# Patient Record
Sex: FEMALE | Race: WHITE | Employment: UNEMPLOYED
[De-identification: names, ages, dates, MRNs, and addresses within clinical notes are randomized per-mention and may not be internally consistent; named-entity substitution may affect disease eponyms.]

---
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
## 2023-12-15 ENCOUNTER — HOSPITAL ENCOUNTER
Dept: HOSPITAL 101 - US | Age: 23
Discharge: HOME | End: 2023-12-15
Payer: SELF-PAY

## 2023-12-15 DIAGNOSIS — N92.6: ICD-10-CM

## 2023-12-15 DIAGNOSIS — Z34.81: Primary | ICD-10-CM

## 2023-12-15 DIAGNOSIS — Z3A.01: ICD-10-CM

## 2023-12-15 PROCEDURE — 76817 TRANSVAGINAL US OBSTETRIC: CPT

## 2024-01-31 ENCOUNTER — HOSPITAL ENCOUNTER
Dept: HOSPITAL 101 - LAB | Age: 24
Discharge: HOME | End: 2024-01-31
Payer: SELF-PAY

## 2024-01-31 DIAGNOSIS — N92.6: Primary | ICD-10-CM

## 2024-01-31 DIAGNOSIS — Z36.0: ICD-10-CM

## 2024-01-31 LAB
ADD MANUAL DIFF: NO
HCT VFR BLD CALC: 41.6 % (ref 36–48)
HEMATOCRIT: 41.6 % (ref 36–48)
HEMOGLOBIN: 14.1 G/DL (ref 12–16)
IMMATURE GRANULOCYTES ABS AUTO: 0.03 10^3/UL (ref 0–0.03)
IMMATURE GRANULOCYTES PCT AUTO: 0.3 % (ref 0–0.5)
LYMPHOCYTES  ABSOLUTE AUTO: 1.7 10^3/UL (ref 1.2–3.8)
MCV RBC: 83.7 FL (ref 81–99)
MEAN CORPUSCULAR HEMOGLOBIN: 28.4 PG (ref 26.7–34)
MEAN CORPUSCULAR HGB CONC: 33.9 G/DL (ref 29.9–35.2)
MEAN CORPUSCULAR VOLUME: 83.7 FL (ref 81–99)
PLATELET # BLD: 231 10^3/UL (ref 150–450)
PLATELET COUNT: 231 10^3/UL (ref 150–450)
RED BLOOD COUNT: 4.97 10^6/UL (ref 4.2–5.4)
THYROID STIMULATING HORMONE: 0.69 UIU/ML (ref 0.36–3.74)
WBC # BLD: 9.7 10^3/UL (ref 4–11)
WHITE BLOOD COUNT: 9.7 10^3/UL (ref 4–11)

## 2024-01-31 PROCEDURE — 87389 HIV-1 AG W/HIV-1&-2 AB AG IA: CPT

## 2024-01-31 PROCEDURE — 86803 HEPATITIS C AB TEST: CPT

## 2024-01-31 PROCEDURE — 87340 HEPATITIS B SURFACE AG IA: CPT

## 2024-01-31 PROCEDURE — 86762 RUBELLA ANTIBODY: CPT

## 2024-01-31 PROCEDURE — 87086 URINE CULTURE/COLONY COUNT: CPT

## 2024-01-31 PROCEDURE — 83036 HEMOGLOBIN GLYCOSYLATED A1C: CPT

## 2024-01-31 PROCEDURE — 86901 BLOOD TYPING SEROLOGIC RH(D): CPT

## 2024-01-31 PROCEDURE — 86592 SYPHILIS TEST NON-TREP QUAL: CPT

## 2024-01-31 PROCEDURE — 36415 COLL VENOUS BLD VENIPUNCTURE: CPT

## 2024-01-31 PROCEDURE — 86850 RBC ANTIBODY SCREEN: CPT

## 2024-01-31 PROCEDURE — 85025 COMPLETE CBC W/AUTO DIFF WBC: CPT

## 2024-01-31 PROCEDURE — 84443 ASSAY THYROID STIM HORMONE: CPT

## 2024-01-31 PROCEDURE — 86900 BLOOD TYPING SEROLOGIC ABO: CPT

## 2024-02-01 LAB — RUBELLA ANTIBODIES, IGG: 9.21 INDEX

## 2024-02-12 ENCOUNTER — HOSPITAL ENCOUNTER (OUTPATIENT)
Dept: HOSPITAL 101 - LAB | Age: 24
Discharge: HOME | End: 2024-02-12
Payer: SELF-PAY

## 2024-02-12 DIAGNOSIS — Z01.419: Primary | ICD-10-CM

## 2024-02-12 PROCEDURE — G0145 SCR C/V CYTO,THINLAYER,RESCR: HCPCS

## 2024-03-12 ENCOUNTER — HOSPITAL ENCOUNTER
Dept: HOSPITAL 101 - NOMS | Age: 24
Discharge: HOME | End: 2024-03-12
Payer: COMMERCIAL

## 2024-03-12 ENCOUNTER — HOSPITAL ENCOUNTER
Age: 24
Discharge: HOME | End: 2024-03-12
Payer: COMMERCIAL

## 2024-03-12 DIAGNOSIS — Z34.92: Primary | ICD-10-CM

## 2024-03-12 DIAGNOSIS — Z3A.20: ICD-10-CM

## 2024-03-12 DIAGNOSIS — Z36.89: Primary | ICD-10-CM

## 2024-03-12 DIAGNOSIS — Z36.89: ICD-10-CM

## 2024-03-12 PROCEDURE — 36415 COLL VENOUS BLD VENIPUNCTURE: CPT

## 2024-03-12 PROCEDURE — 76817 TRANSVAGINAL US OBSTETRIC: CPT

## 2024-03-12 PROCEDURE — 76805 OB US >/= 14 WKS SNGL FETUS: CPT

## 2024-03-12 PROCEDURE — 82105 ALPHA-FETOPROTEIN SERUM: CPT

## 2024-04-11 ENCOUNTER — TELEPHONE (OUTPATIENT)
Dept: PERINATAL CARE | Age: 24
End: 2024-04-11

## 2024-04-11 ENCOUNTER — HOSPITAL ENCOUNTER (OUTPATIENT)
Age: 24
Setting detail: SPECIMEN
Discharge: HOME | End: 2024-04-11

## 2024-04-11 ENCOUNTER — ROUTINE PRENATAL (OUTPATIENT)
Dept: PERINATAL CARE | Age: 24
End: 2024-04-11
Payer: COMMERCIAL

## 2024-04-11 VITALS
SYSTOLIC BLOOD PRESSURE: 118 MMHG | HEIGHT: 64 IN | HEART RATE: 80 BPM | RESPIRATION RATE: 16 BRPM | BODY MASS INDEX: 27.31 KG/M2 | DIASTOLIC BLOOD PRESSURE: 82 MMHG | TEMPERATURE: 98.4 F | WEIGHT: 160 LBS

## 2024-04-11 DIAGNOSIS — O99.282 METHYLENETETRAHYDROFOLATE REDUCTASE DEFICIENCY AFFECTING PREGNANCY IN SECOND TRIMESTER (HCC): ICD-10-CM

## 2024-04-11 DIAGNOSIS — D68.59 MATERNAL ANTITHROMBIN III DEFICIENCY COMPLICATING PREGNANCY (HCC): Primary | ICD-10-CM

## 2024-04-11 DIAGNOSIS — E72.12 METHYLENETETRAHYDROFOLATE REDUCTASE DEFICIENCY AFFECTING PREGNANCY IN SECOND TRIMESTER (HCC): ICD-10-CM

## 2024-04-11 DIAGNOSIS — O99.891 CURRENT MATERNAL CONDITION AFFECTING PREGNANCY: ICD-10-CM

## 2024-04-11 DIAGNOSIS — Z3A.24 24 WEEKS GESTATION OF PREGNANCY: ICD-10-CM

## 2024-04-11 DIAGNOSIS — O99.119 MATERNAL ANTITHROMBIN III DEFICIENCY COMPLICATING PREGNANCY (HCC): Primary | ICD-10-CM

## 2024-04-11 DIAGNOSIS — Z82.49 FAMILY HISTORY OF THROMBOEMBOLIC DISEASE: ICD-10-CM

## 2024-04-11 PROCEDURE — 76811 OB US DETAILED SNGL FETUS: CPT | Performed by: OBSTETRICS & GYNECOLOGY

## 2024-04-11 NOTE — TELEPHONE ENCOUNTER
Lovenox 40 mg injection sq daily a 12 week supply Rx was called into the pharmacy to [place]. Rx was called in by [facility] per Dr. Briseyda SPANGLER.

## 2024-04-12 LAB
SEND OUT REPORT: NORMAL
TEST NAME: NORMAL

## 2024-05-08 ENCOUNTER — HOSPITAL ENCOUNTER
Dept: HOSPITAL 101 - LAB | Age: 24
Discharge: HOME | End: 2024-05-08
Payer: COMMERCIAL

## 2024-05-08 DIAGNOSIS — Z13.1: Primary | ICD-10-CM

## 2024-05-08 LAB
ADD MANUAL DIFF: NO
GLUCOSE 1 HOUR: 108 MG/DL (ref ?–130)
HEMATOCRIT: 33 % (ref 36–48)
HEMOGLOBIN: 11 G/DL (ref 12–16)
IMMATURE GRANULOCYTES ABS AUTO: 0.03 10^3/UL (ref 0–0.03)
IMMATURE GRANULOCYTES PCT AUTO: 0.3 % (ref 0–0.5)
LYMPHOCYTES  ABSOLUTE AUTO: 1.5 10^3/UL (ref 1.2–3.8)
MCV RBC: 82.9 FL (ref 81–99)
MEAN CORPUSCULAR HEMOGLOBIN: 27.6 PG (ref 26.7–34)
MEAN CORPUSCULAR HGB CONC: 33.3 G/DL (ref 29.9–35.2)
PLATELET COUNT: 223 10^3/UL (ref 150–450)
RED BLOOD COUNT: 3.98 10^6/UL (ref 4.2–5.4)
WHITE BLOOD COUNT: 9.2 10^3/UL (ref 4–11)

## 2024-05-08 PROCEDURE — 36415 COLL VENOUS BLD VENIPUNCTURE: CPT

## 2024-05-08 PROCEDURE — 85025 COMPLETE CBC W/AUTO DIFF WBC: CPT

## 2024-05-08 PROCEDURE — 82950 GLUCOSE TEST: CPT

## 2024-05-23 ENCOUNTER — ROUTINE PRENATAL (OUTPATIENT)
Dept: PERINATAL CARE | Age: 24
End: 2024-05-23
Payer: COMMERCIAL

## 2024-05-23 VITALS
WEIGHT: 160 LBS | SYSTOLIC BLOOD PRESSURE: 120 MMHG | DIASTOLIC BLOOD PRESSURE: 84 MMHG | BODY MASS INDEX: 27.31 KG/M2 | TEMPERATURE: 98.1 F | RESPIRATION RATE: 16 BRPM | HEIGHT: 64 IN | HEART RATE: 88 BPM

## 2024-05-23 DIAGNOSIS — D68.59 MATERNAL ANTITHROMBIN III DEFICIENCY COMPLICATING PREGNANCY (HCC): Primary | ICD-10-CM

## 2024-05-23 DIAGNOSIS — O99.283 METHYLENETETRAHYDROFOLATE REDUCTASE DEFICIENCY AFFECTING PREGNANCY IN THIRD TRIMESTER (HCC): ICD-10-CM

## 2024-05-23 DIAGNOSIS — O99.891 CURRENT MATERNAL CONDITION AFFECTING PREGNANCY: ICD-10-CM

## 2024-05-23 DIAGNOSIS — Z36.4 ULTRASOUND FOR ANTENATAL SCREENING FOR FETAL GROWTH RESTRICTION: ICD-10-CM

## 2024-05-23 DIAGNOSIS — Z3A.30 30 WEEKS GESTATION OF PREGNANCY: ICD-10-CM

## 2024-05-23 DIAGNOSIS — Z82.49 FAMILY HISTORY OF THROMBOEMBOLIC DISEASE: ICD-10-CM

## 2024-05-23 DIAGNOSIS — O99.119 MATERNAL ANTITHROMBIN III DEFICIENCY COMPLICATING PREGNANCY (HCC): Primary | ICD-10-CM

## 2024-05-23 DIAGNOSIS — E72.12 METHYLENETETRAHYDROFOLATE REDUCTASE DEFICIENCY AFFECTING PREGNANCY IN THIRD TRIMESTER (HCC): ICD-10-CM

## 2024-05-23 PROCEDURE — 76819 FETAL BIOPHYS PROFIL W/O NST: CPT | Performed by: OBSTETRICS & GYNECOLOGY

## 2024-05-23 PROCEDURE — 76816 OB US FOLLOW-UP PER FETUS: CPT | Performed by: OBSTETRICS & GYNECOLOGY

## 2024-05-23 PROCEDURE — 99999 PR OFFICE/OUTPT VISIT,PROCEDURE ONLY: CPT | Performed by: OBSTETRICS & GYNECOLOGY

## 2024-05-23 RX ORDER — ENOXAPARIN SODIUM 100 MG/ML
40 INJECTION SUBCUTANEOUS DAILY
COMMUNITY
Start: 2024-04-12

## 2024-06-26 ENCOUNTER — ROUTINE PRENATAL (OUTPATIENT)
Dept: PERINATAL CARE | Age: 24
End: 2024-06-26
Payer: COMMERCIAL

## 2024-06-26 VITALS
BODY MASS INDEX: 27.74 KG/M2 | TEMPERATURE: 97.7 F | HEART RATE: 81 BPM | SYSTOLIC BLOOD PRESSURE: 135 MMHG | WEIGHT: 162.48 LBS | DIASTOLIC BLOOD PRESSURE: 80 MMHG | HEIGHT: 64 IN | RESPIRATION RATE: 16 BRPM

## 2024-06-26 DIAGNOSIS — Z36.4 ULTRASOUND FOR ANTENATAL SCREENING FOR FETAL GROWTH RESTRICTION: ICD-10-CM

## 2024-06-26 DIAGNOSIS — Z36.3 ENCOUNTER FOR ROUTINE SCREENING FOR MALFORMATION USING ULTRASONICS: ICD-10-CM

## 2024-06-26 DIAGNOSIS — E72.12 METHYLENETETRAHYDROFOLATE REDUCTASE DEFICIENCY AFFECTING PREGNANCY IN THIRD TRIMESTER (HCC): ICD-10-CM

## 2024-06-26 DIAGNOSIS — Z3A.35 35 WEEKS GESTATION OF PREGNANCY: ICD-10-CM

## 2024-06-26 DIAGNOSIS — O99.119 MATERNAL ANTITHROMBIN III DEFICIENCY COMPLICATING PREGNANCY (HCC): Primary | ICD-10-CM

## 2024-06-26 DIAGNOSIS — Z82.49 FAMILY HISTORY OF THROMBOEMBOLIC DISEASE: ICD-10-CM

## 2024-06-26 DIAGNOSIS — O99.891 CURRENT MATERNAL CONDITION AFFECTING PREGNANCY: ICD-10-CM

## 2024-06-26 DIAGNOSIS — O99.283 METHYLENETETRAHYDROFOLATE REDUCTASE DEFICIENCY AFFECTING PREGNANCY IN THIRD TRIMESTER (HCC): ICD-10-CM

## 2024-06-26 DIAGNOSIS — D68.59 MATERNAL ANTITHROMBIN III DEFICIENCY COMPLICATING PREGNANCY (HCC): Primary | ICD-10-CM

## 2024-06-26 PROCEDURE — 99999 PR OFFICE/OUTPT VISIT,PROCEDURE ONLY: CPT | Performed by: OBSTETRICS & GYNECOLOGY

## 2024-06-26 PROCEDURE — 76805 OB US >/= 14 WKS SNGL FETUS: CPT | Performed by: OBSTETRICS & GYNECOLOGY

## 2024-06-26 PROCEDURE — 76819 FETAL BIOPHYS PROFIL W/O NST: CPT | Performed by: OBSTETRICS & GYNECOLOGY

## 2024-07-03 ENCOUNTER — HOSPITAL ENCOUNTER (OUTPATIENT)
Dept: HOSPITAL 101 - LAB | Age: 24
Discharge: HOME | End: 2024-07-03
Payer: COMMERCIAL

## 2024-07-03 DIAGNOSIS — Z34.93: Primary | ICD-10-CM

## 2024-07-03 PROCEDURE — 87081 CULTURE SCREEN ONLY: CPT

## 2024-07-16 ENCOUNTER — HOSPITAL ENCOUNTER (INPATIENT)
Age: 24
LOS: 2 days | Discharge: HOME | DRG: 540 | End: 2024-07-18
Payer: COMMERCIAL

## 2024-07-16 VITALS — SYSTOLIC BLOOD PRESSURE: 121 MMHG | HEART RATE: 72 BPM | DIASTOLIC BLOOD PRESSURE: 75 MMHG

## 2024-07-16 VITALS — DIASTOLIC BLOOD PRESSURE: 89 MMHG | SYSTOLIC BLOOD PRESSURE: 120 MMHG | OXYGEN SATURATION: 97 % | HEART RATE: 60 BPM

## 2024-07-16 VITALS — OXYGEN SATURATION: 96 % | HEART RATE: 64 BPM

## 2024-07-16 VITALS — SYSTOLIC BLOOD PRESSURE: 131 MMHG | HEART RATE: 78 BPM | DIASTOLIC BLOOD PRESSURE: 90 MMHG

## 2024-07-16 VITALS — SYSTOLIC BLOOD PRESSURE: 126 MMHG | HEART RATE: 81 BPM | OXYGEN SATURATION: 96 % | DIASTOLIC BLOOD PRESSURE: 83 MMHG

## 2024-07-16 VITALS — OXYGEN SATURATION: 96 % | HEART RATE: 80 BPM

## 2024-07-16 VITALS — HEART RATE: 66 BPM | OXYGEN SATURATION: 96 % | DIASTOLIC BLOOD PRESSURE: 85 MMHG | SYSTOLIC BLOOD PRESSURE: 121 MMHG

## 2024-07-16 VITALS — SYSTOLIC BLOOD PRESSURE: 116 MMHG | OXYGEN SATURATION: 96 % | HEART RATE: 59 BPM | DIASTOLIC BLOOD PRESSURE: 84 MMHG

## 2024-07-16 VITALS — DIASTOLIC BLOOD PRESSURE: 102 MMHG | SYSTOLIC BLOOD PRESSURE: 127 MMHG

## 2024-07-16 VITALS — SYSTOLIC BLOOD PRESSURE: 125 MMHG | DIASTOLIC BLOOD PRESSURE: 85 MMHG | OXYGEN SATURATION: 96 % | HEART RATE: 90 BPM

## 2024-07-16 VITALS — HEART RATE: 94 BPM | DIASTOLIC BLOOD PRESSURE: 73 MMHG | SYSTOLIC BLOOD PRESSURE: 113 MMHG

## 2024-07-16 VITALS — DIASTOLIC BLOOD PRESSURE: 91 MMHG | OXYGEN SATURATION: 97 % | HEART RATE: 82 BPM | SYSTOLIC BLOOD PRESSURE: 130 MMHG

## 2024-07-16 VITALS — SYSTOLIC BLOOD PRESSURE: 130 MMHG | HEART RATE: 81 BPM | DIASTOLIC BLOOD PRESSURE: 88 MMHG

## 2024-07-16 VITALS — HEART RATE: 66 BPM | SYSTOLIC BLOOD PRESSURE: 93 MMHG | DIASTOLIC BLOOD PRESSURE: 53 MMHG

## 2024-07-16 VITALS — DIASTOLIC BLOOD PRESSURE: 68 MMHG | TEMPERATURE: 98.1 F | SYSTOLIC BLOOD PRESSURE: 112 MMHG | HEART RATE: 74 BPM

## 2024-07-16 VITALS — DIASTOLIC BLOOD PRESSURE: 64 MMHG | HEART RATE: 60 BPM | SYSTOLIC BLOOD PRESSURE: 107 MMHG

## 2024-07-16 VITALS — OXYGEN SATURATION: 97 % | HEART RATE: 59 BPM

## 2024-07-16 VITALS — HEART RATE: 73 BPM | SYSTOLIC BLOOD PRESSURE: 109 MMHG | DIASTOLIC BLOOD PRESSURE: 79 MMHG | OXYGEN SATURATION: 97 %

## 2024-07-16 VITALS — OXYGEN SATURATION: 96 % | HEART RATE: 87 BPM | SYSTOLIC BLOOD PRESSURE: 133 MMHG | DIASTOLIC BLOOD PRESSURE: 83 MMHG

## 2024-07-16 VITALS — SYSTOLIC BLOOD PRESSURE: 117 MMHG | DIASTOLIC BLOOD PRESSURE: 75 MMHG | HEART RATE: 77 BPM

## 2024-07-16 VITALS — OXYGEN SATURATION: 97 % | SYSTOLIC BLOOD PRESSURE: 111 MMHG | DIASTOLIC BLOOD PRESSURE: 80 MMHG | HEART RATE: 73 BPM

## 2024-07-16 VITALS — SYSTOLIC BLOOD PRESSURE: 120 MMHG | DIASTOLIC BLOOD PRESSURE: 87 MMHG | HEART RATE: 93 BPM

## 2024-07-16 VITALS — HEART RATE: 67 BPM | OXYGEN SATURATION: 97 %

## 2024-07-16 VITALS — DIASTOLIC BLOOD PRESSURE: 72 MMHG | SYSTOLIC BLOOD PRESSURE: 113 MMHG | HEART RATE: 57 BPM

## 2024-07-16 VITALS — SYSTOLIC BLOOD PRESSURE: 117 MMHG | DIASTOLIC BLOOD PRESSURE: 77 MMHG | OXYGEN SATURATION: 97 % | HEART RATE: 70 BPM

## 2024-07-16 VITALS — SYSTOLIC BLOOD PRESSURE: 125 MMHG | HEART RATE: 95 BPM | DIASTOLIC BLOOD PRESSURE: 69 MMHG

## 2024-07-16 VITALS — HEART RATE: 84 BPM | OXYGEN SATURATION: 96 %

## 2024-07-16 VITALS — HEART RATE: 57 BPM | DIASTOLIC BLOOD PRESSURE: 82 MMHG | SYSTOLIC BLOOD PRESSURE: 114 MMHG

## 2024-07-16 VITALS — TEMPERATURE: 97.2 F

## 2024-07-16 VITALS — OXYGEN SATURATION: 97 % | SYSTOLIC BLOOD PRESSURE: 123 MMHG | DIASTOLIC BLOOD PRESSURE: 81 MMHG | HEART RATE: 78 BPM

## 2024-07-16 VITALS — OXYGEN SATURATION: 97 % | HEART RATE: 80 BPM

## 2024-07-16 VITALS — SYSTOLIC BLOOD PRESSURE: 137 MMHG | DIASTOLIC BLOOD PRESSURE: 92 MMHG | HEART RATE: 79 BPM

## 2024-07-16 VITALS — DIASTOLIC BLOOD PRESSURE: 91 MMHG | SYSTOLIC BLOOD PRESSURE: 132 MMHG

## 2024-07-16 VITALS — HEART RATE: 60 BPM | OXYGEN SATURATION: 97 %

## 2024-07-16 VITALS — TEMPERATURE: 98.3 F

## 2024-07-16 VITALS — SYSTOLIC BLOOD PRESSURE: 133 MMHG | DIASTOLIC BLOOD PRESSURE: 81 MMHG | HEART RATE: 92 BPM

## 2024-07-16 VITALS — HEART RATE: 84 BPM | DIASTOLIC BLOOD PRESSURE: 87 MMHG | OXYGEN SATURATION: 97 % | SYSTOLIC BLOOD PRESSURE: 140 MMHG

## 2024-07-16 VITALS — HEART RATE: 79 BPM

## 2024-07-16 VITALS — SYSTOLIC BLOOD PRESSURE: 120 MMHG | DIASTOLIC BLOOD PRESSURE: 71 MMHG | HEART RATE: 107 BPM

## 2024-07-16 VITALS — OXYGEN SATURATION: 96 % | HEART RATE: 58 BPM | SYSTOLIC BLOOD PRESSURE: 128 MMHG | DIASTOLIC BLOOD PRESSURE: 81 MMHG

## 2024-07-16 VITALS — SYSTOLIC BLOOD PRESSURE: 109 MMHG | HEART RATE: 63 BPM | DIASTOLIC BLOOD PRESSURE: 64 MMHG

## 2024-07-16 VITALS — HEART RATE: 84 BPM | DIASTOLIC BLOOD PRESSURE: 59 MMHG | SYSTOLIC BLOOD PRESSURE: 100 MMHG

## 2024-07-16 VITALS — SYSTOLIC BLOOD PRESSURE: 122 MMHG | DIASTOLIC BLOOD PRESSURE: 93 MMHG

## 2024-07-16 VITALS — HEART RATE: 93 BPM | SYSTOLIC BLOOD PRESSURE: 137 MMHG | DIASTOLIC BLOOD PRESSURE: 88 MMHG

## 2024-07-16 VITALS — DIASTOLIC BLOOD PRESSURE: 65 MMHG | HEART RATE: 66 BPM | SYSTOLIC BLOOD PRESSURE: 117 MMHG

## 2024-07-16 VITALS — HEART RATE: 69 BPM | DIASTOLIC BLOOD PRESSURE: 56 MMHG | SYSTOLIC BLOOD PRESSURE: 108 MMHG

## 2024-07-16 VITALS — TEMPERATURE: 97.34 F

## 2024-07-16 VITALS — DIASTOLIC BLOOD PRESSURE: 88 MMHG | HEART RATE: 89 BPM | OXYGEN SATURATION: 95 % | SYSTOLIC BLOOD PRESSURE: 123 MMHG

## 2024-07-16 VITALS — HEART RATE: 72 BPM | OXYGEN SATURATION: 95 %

## 2024-07-16 VITALS — TEMPERATURE: 98.6 F

## 2024-07-16 VITALS — DIASTOLIC BLOOD PRESSURE: 67 MMHG | HEART RATE: 61 BPM | SYSTOLIC BLOOD PRESSURE: 104 MMHG

## 2024-07-16 VITALS — HEART RATE: 62 BPM | OXYGEN SATURATION: 96 % | SYSTOLIC BLOOD PRESSURE: 120 MMHG | DIASTOLIC BLOOD PRESSURE: 82 MMHG

## 2024-07-16 VITALS — SYSTOLIC BLOOD PRESSURE: 107 MMHG | HEART RATE: 63 BPM | DIASTOLIC BLOOD PRESSURE: 64 MMHG

## 2024-07-16 VITALS — SYSTOLIC BLOOD PRESSURE: 152 MMHG | DIASTOLIC BLOOD PRESSURE: 105 MMHG

## 2024-07-16 VITALS — HEART RATE: 74 BPM | OXYGEN SATURATION: 97 %

## 2024-07-16 VITALS — TEMPERATURE: 97.1 F

## 2024-07-16 VITALS — TEMPERATURE: 97.16 F

## 2024-07-16 VITALS — TEMPERATURE: 96.44 F

## 2024-07-16 DIAGNOSIS — F41.9: ICD-10-CM

## 2024-07-16 DIAGNOSIS — Z3A.38: ICD-10-CM

## 2024-07-16 DIAGNOSIS — Z87.891: ICD-10-CM

## 2024-07-16 DIAGNOSIS — I69.351: ICD-10-CM

## 2024-07-16 LAB
CANNABINOID SCREEN URINE: NEGATIVE
HCT VFR BLD CALC: 35.5 % (ref 36–48)
HEMATOCRIT: 35.5 % (ref 36–48)
HEMOGLOBIN: 11.3 G/DL (ref 12–16)
MCV RBC: 77 FL (ref 81–99)
MEAN CORPUSCULAR HEMOGLOBIN: 24.5 PG (ref 26.7–34)
MEAN CORPUSCULAR HGB CONC: 31.8 G/DL (ref 29.9–35.2)
MEAN CORPUSCULAR VOLUME: 77 FL (ref 81–99)
METHAMPHETAMINES SCREEN URINE: NEGATIVE
PLATELET # BLD: 216 10^3/UL (ref 150–450)
PLATELET COUNT: 216 10^3/UL (ref 150–450)
RED BLOOD COUNT: 4.61 10^6/UL (ref 4.2–5.4)
TRICYCLIC ANTIDEPRESSANT URINE: NEGATIVE
WBC # BLD: 9.9 10^3/UL (ref 4–11)
WHITE BLOOD COUNT: 9.9 10^3/UL (ref 4–11)

## 2024-07-16 PROCEDURE — 88307 TISSUE EXAM BY PATHOLOGIST: CPT

## 2024-07-16 PROCEDURE — 86870 RBC ANTIBODY IDENTIFICATION: CPT

## 2024-07-16 PROCEDURE — 59050 FETAL MONITOR W/REPORT: CPT

## 2024-07-16 PROCEDURE — 86900 BLOOD TYPING SEROLOGIC ABO: CPT

## 2024-07-16 PROCEDURE — 96376 TX/PRO/DX INJ SAME DRUG ADON: CPT

## 2024-07-16 PROCEDURE — 96375 TX/PRO/DX INJ NEW DRUG ADDON: CPT

## 2024-07-16 PROCEDURE — 86885 COOMBS TEST INDIRECT QUAL: CPT

## 2024-07-16 PROCEDURE — 86906 BLD TYPING SEROLOGIC RH PHNT: CPT

## 2024-07-16 PROCEDURE — 36415 COLL VENOUS BLD VENIPUNCTURE: CPT

## 2024-07-16 PROCEDURE — 86901 BLOOD TYPING SEROLOGIC RH(D): CPT

## 2024-07-16 PROCEDURE — 85027 COMPLETE CBC AUTOMATED: CPT

## 2024-07-16 PROCEDURE — 80307 DRUG TEST PRSMV CHEM ANLYZR: CPT

## 2024-07-16 PROCEDURE — 86850 RBC ANTIBODY SCREEN: CPT

## 2024-07-16 PROCEDURE — 96366 THER/PROPH/DIAG IV INF ADDON: CPT

## 2024-07-16 PROCEDURE — 86905 BLOOD TYPING RBC ANTIGENS: CPT

## 2024-07-16 PROCEDURE — 86880 COOMBS TEST DIRECT: CPT

## 2024-07-16 PROCEDURE — 94667 MNPJ CHEST WALL 1ST: CPT

## 2024-07-16 PROCEDURE — 96372 THER/PROPH/DIAG INJ SC/IM: CPT

## 2024-07-16 PROCEDURE — 96365 THER/PROPH/DIAG IV INF INIT: CPT

## 2024-07-16 PROCEDURE — 85025 COMPLETE CBC W/AUTO DIFF WBC: CPT

## 2024-07-17 VITALS — DIASTOLIC BLOOD PRESSURE: 104 MMHG | SYSTOLIC BLOOD PRESSURE: 153 MMHG

## 2024-07-17 VITALS — HEART RATE: 68 BPM | DIASTOLIC BLOOD PRESSURE: 82 MMHG | SYSTOLIC BLOOD PRESSURE: 121 MMHG

## 2024-07-17 VITALS — TEMPERATURE: 98.1 F

## 2024-07-17 VITALS — DIASTOLIC BLOOD PRESSURE: 100 MMHG | SYSTOLIC BLOOD PRESSURE: 141 MMHG

## 2024-07-17 VITALS — DIASTOLIC BLOOD PRESSURE: 111 MMHG | SYSTOLIC BLOOD PRESSURE: 139 MMHG

## 2024-07-17 VITALS — DIASTOLIC BLOOD PRESSURE: 99 MMHG | SYSTOLIC BLOOD PRESSURE: 140 MMHG

## 2024-07-17 VITALS — HEART RATE: 64 BPM | SYSTOLIC BLOOD PRESSURE: 116 MMHG | DIASTOLIC BLOOD PRESSURE: 81 MMHG

## 2024-07-17 VITALS — TEMPERATURE: 97.16 F

## 2024-07-17 LAB
ADD MANUAL DIFF: NO
HCT VFR BLD CALC: 30.6 % (ref 36–48)
HEMATOCRIT: 30.6 % (ref 36–48)
HEMOGLOBIN: 9.7 G/DL (ref 12–16)
IMMATURE GRANULOCYTES ABS AUTO: 0.13 10^3/UL (ref 0–0.03)
IMMATURE GRANULOCYTES PCT AUTO: 0.7 % (ref 0–0.5)
LYMPHOCYTES  ABSOLUTE AUTO: 1.1 10^3/UL (ref 1.2–3.8)
MCV RBC: 79.5 FL (ref 81–99)
MEAN CORPUSCULAR HEMOGLOBIN: 25.2 PG (ref 26.7–34)
MEAN CORPUSCULAR HGB CONC: 31.7 G/DL (ref 29.9–35.2)
MEAN CORPUSCULAR VOLUME: 79.5 FL (ref 81–99)
PLATELET # BLD: 214 10^3/UL (ref 150–450)
PLATELET COUNT: 214 10^3/UL (ref 150–450)
RED BLOOD COUNT: 3.85 10^6/UL (ref 4.2–5.4)
WBC # BLD: 19.3 10^3/UL (ref 4–11)
WHITE BLOOD COUNT: 19.3 10^3/UL (ref 4–11)

## 2024-07-18 VITALS — TEMPERATURE: 97.4 F

## 2024-07-18 VITALS — HEART RATE: 68 BPM | DIASTOLIC BLOOD PRESSURE: 88 MMHG | SYSTOLIC BLOOD PRESSURE: 124 MMHG

## 2024-07-18 VITALS — DIASTOLIC BLOOD PRESSURE: 62 MMHG | SYSTOLIC BLOOD PRESSURE: 119 MMHG | HEART RATE: 74 BPM

## 2024-07-18 VITALS — TEMPERATURE: 98.42 F

## 2024-08-27 ENCOUNTER — HOSPITAL ENCOUNTER
Dept: HOSPITAL 101 - PST | Age: 24
Discharge: HOME | End: 2024-08-27
Payer: COMMERCIAL

## 2024-08-27 VITALS
OXYGEN SATURATION: 97 % | DIASTOLIC BLOOD PRESSURE: 85 MMHG | HEART RATE: 61 BPM | TEMPERATURE: 97.3 F | SYSTOLIC BLOOD PRESSURE: 132 MMHG

## 2024-08-27 DIAGNOSIS — Z01.812: Primary | ICD-10-CM

## 2024-08-27 LAB
ADD MANUAL DIFF: NO
ANION GAP: 12
BLOOD UREA NITROGEN: 9 MG/DL (ref 7–18)
CALCIUM: 9.1 MG/DL (ref 8.5–10.1)
CARBON DIOXIDE: 28.6 MMOL/L (ref 21–32)
CHLORIDE: 106 MMOL/L (ref 98–107)
CO2 BLD-SCNC: 28.6 MMOL/L (ref 21–32)
ESTIMATED GFR (AFRICAN AMERICA: >60 (ref 60–?)
ESTIMATED GFR (NON-AFRICAN AME: >60 (ref 60–?)
GLUCOSE BLD-MCNC: 96 MG/DL (ref 74–106)
HCT VFR BLD CALC: 34.3 % (ref 36–48)
HEMATOCRIT: 34.3 % (ref 36–48)
HEMOGLOBIN: 10.7 G/DL (ref 12–16)
IMMATURE GRANULOCYTES ABS AUTO: 0.01 10^3/UL (ref 0–0.03)
IMMATURE GRANULOCYTES PCT AUTO: 0.2 % (ref 0–0.5)
INR: 1.13
LYMPHOCYTES  ABSOLUTE AUTO: 1.7 10^3/UL (ref 1.2–3.8)
MCV RBC: 78.1 FL (ref 81–99)
MEAN CORPUSCULAR HEMOGLOBIN: 24.4 PG (ref 26.7–34)
MEAN CORPUSCULAR HGB CONC: 31.2 G/DL (ref 29.9–35.2)
MEAN CORPUSCULAR VOLUME: 78.1 FL (ref 81–99)
PARTIAL THROMBOPLASTIN TIME: 28.9 SEC (ref 22.3–36.2)
PLATELET # BLD: 231 10^3/UL (ref 150–450)
PLATELET COUNT: 231 10^3/UL (ref 150–450)
POTASSIUM SERPLBLD-SCNC: 3.6 MMOL/L (ref 3.5–5.1)
POTASSIUM: 3.6 MMOL/L (ref 3.5–5.1)
PROTHROMBIN TIME: 11.8 SEC (ref 9–11.6)
RED BLOOD COUNT: 4.39 10^6/UL (ref 4.2–5.4)
SODIUM BLD-SCNC: 143 MMOL/L (ref 136–145)
SODIUM: 143 MMOL/L (ref 136–145)
WBC # BLD: 5.2 10^3/UL (ref 4–11)
WHITE BLOOD COUNT: 5.2 10^3/UL (ref 4–11)

## 2024-08-27 PROCEDURE — 80048 BASIC METABOLIC PNL TOTAL CA: CPT

## 2024-08-27 PROCEDURE — 85730 THROMBOPLASTIN TIME PARTIAL: CPT

## 2024-08-27 PROCEDURE — 85025 COMPLETE CBC W/AUTO DIFF WBC: CPT

## 2024-08-27 PROCEDURE — 85610 PROTHROMBIN TIME: CPT

## 2024-09-06 ENCOUNTER — HOSPITAL ENCOUNTER (OUTPATIENT)
Age: 24
Discharge: HOME | End: 2024-09-06
Payer: COMMERCIAL

## 2024-09-06 VITALS — HEART RATE: 120 BPM | OXYGEN SATURATION: 96 %

## 2024-09-06 VITALS — SYSTOLIC BLOOD PRESSURE: 167 MMHG | DIASTOLIC BLOOD PRESSURE: 94 MMHG | OXYGEN SATURATION: 98 % | HEART RATE: 97 BPM

## 2024-09-06 VITALS — SYSTOLIC BLOOD PRESSURE: 183 MMHG | OXYGEN SATURATION: 95 % | DIASTOLIC BLOOD PRESSURE: 100 MMHG | HEART RATE: 119 BPM

## 2024-09-06 VITALS — HEART RATE: 117 BPM | OXYGEN SATURATION: 95 % | SYSTOLIC BLOOD PRESSURE: 171 MMHG | DIASTOLIC BLOOD PRESSURE: 111 MMHG

## 2024-09-06 VITALS
SYSTOLIC BLOOD PRESSURE: 141 MMHG | OXYGEN SATURATION: 98 % | TEMPERATURE: 98.06 F | DIASTOLIC BLOOD PRESSURE: 96 MMHG | HEART RATE: 73 BPM

## 2024-09-06 VITALS — DIASTOLIC BLOOD PRESSURE: 94 MMHG | SYSTOLIC BLOOD PRESSURE: 161 MMHG | OXYGEN SATURATION: 96 % | HEART RATE: 94 BPM

## 2024-09-06 VITALS
SYSTOLIC BLOOD PRESSURE: 171 MMHG | TEMPERATURE: 97.6 F | HEART RATE: 116 BPM | OXYGEN SATURATION: 95 % | DIASTOLIC BLOOD PRESSURE: 111 MMHG

## 2024-09-06 VITALS — DIASTOLIC BLOOD PRESSURE: 95 MMHG | HEART RATE: 95 BPM | OXYGEN SATURATION: 97 % | SYSTOLIC BLOOD PRESSURE: 171 MMHG

## 2024-09-06 VITALS — DIASTOLIC BLOOD PRESSURE: 99 MMHG | HEART RATE: 101 BPM | SYSTOLIC BLOOD PRESSURE: 172 MMHG | OXYGEN SATURATION: 98 %

## 2024-09-06 VITALS — OXYGEN SATURATION: 98 % | DIASTOLIC BLOOD PRESSURE: 97 MMHG | SYSTOLIC BLOOD PRESSURE: 166 MMHG | HEART RATE: 104 BPM

## 2024-09-06 VITALS — SYSTOLIC BLOOD PRESSURE: 168 MMHG | OXYGEN SATURATION: 97 % | DIASTOLIC BLOOD PRESSURE: 102 MMHG | HEART RATE: 95 BPM

## 2024-09-06 VITALS — OXYGEN SATURATION: 95 % | DIASTOLIC BLOOD PRESSURE: 100 MMHG | SYSTOLIC BLOOD PRESSURE: 165 MMHG | HEART RATE: 120 BPM

## 2024-09-06 VITALS — DIASTOLIC BLOOD PRESSURE: 98 MMHG | SYSTOLIC BLOOD PRESSURE: 161 MMHG

## 2024-09-06 VITALS — BODY MASS INDEX: 25.6 KG/M2 | BODY MASS INDEX: 25.8 KG/M2

## 2024-09-06 DIAGNOSIS — Z30.2: Primary | ICD-10-CM

## 2024-09-06 DIAGNOSIS — Z87.891: ICD-10-CM

## 2024-09-06 LAB
ADD MANUAL DIFF: NO
HCT VFR BLD CALC: 34.7 % (ref 36–48)
HEMATOCRIT: 34.7 % (ref 36–48)
HEMOGLOBIN: 10.9 G/DL (ref 12–16)
IMMATURE GRANULOCYTES ABS AUTO: 0.01 10^3/UL (ref 0–0.03)
IMMATURE GRANULOCYTES PCT AUTO: 0.2 % (ref 0–0.5)
LYMPHOCYTES  ABSOLUTE AUTO: 2.3 10^3/UL (ref 1.2–3.8)
MCV RBC: 77.1 FL (ref 81–99)
MEAN CORPUSCULAR HEMOGLOBIN: 24.2 PG (ref 26.7–34)
MEAN CORPUSCULAR HGB CONC: 31.4 G/DL (ref 29.9–35.2)
MEAN CORPUSCULAR VOLUME: 77.1 FL (ref 81–99)
PLATELET # BLD: 255 10^3/UL (ref 150–450)
PLATELET COUNT: 255 10^3/UL (ref 150–450)
RED BLOOD COUNT: 4.5 10^6/UL (ref 4.2–5.4)
WBC # BLD: 5.4 10^3/UL (ref 4–11)
WHITE BLOOD COUNT: 5.4 10^3/UL (ref 4–11)

## 2024-09-06 PROCEDURE — 58661 LAPAROSCOPY REMOVE ADNEXA: CPT

## 2024-09-06 PROCEDURE — 36415 COLL VENOUS BLD VENIPUNCTURE: CPT

## 2024-09-06 PROCEDURE — 84702 CHORIONIC GONADOTROPIN TEST: CPT

## 2024-09-06 PROCEDURE — 85025 COMPLETE CBC W/AUTO DIFF WBC: CPT
